# Patient Record
Sex: FEMALE | Race: WHITE | NOT HISPANIC OR LATINO
[De-identification: names, ages, dates, MRNs, and addresses within clinical notes are randomized per-mention and may not be internally consistent; named-entity substitution may affect disease eponyms.]

---

## 2017-03-30 ENCOUNTER — TRANSCRIPTION ENCOUNTER (OUTPATIENT)
Age: 22
End: 2017-03-30

## 2017-08-24 ENCOUNTER — TRANSCRIPTION ENCOUNTER (OUTPATIENT)
Age: 22
End: 2017-08-24

## 2017-10-03 ENCOUNTER — TRANSCRIPTION ENCOUNTER (OUTPATIENT)
Age: 22
End: 2017-10-03

## 2018-04-20 PROBLEM — Z00.00 ENCOUNTER FOR PREVENTIVE HEALTH EXAMINATION: Status: ACTIVE | Noted: 2018-04-20

## 2018-04-23 ENCOUNTER — APPOINTMENT (OUTPATIENT)
Dept: ORTHOPEDIC SURGERY | Facility: CLINIC | Age: 23
End: 2018-04-23
Payer: COMMERCIAL

## 2018-04-23 VITALS
BODY MASS INDEX: 20.46 KG/M2 | WEIGHT: 107 LBS | HEART RATE: 87 BPM | HEIGHT: 60.5 IN | SYSTOLIC BLOOD PRESSURE: 114 MMHG | DIASTOLIC BLOOD PRESSURE: 76 MMHG

## 2018-04-23 PROCEDURE — 99204 OFFICE O/P NEW MOD 45 MIN: CPT

## 2018-04-23 PROCEDURE — 72082 X-RAY EXAM ENTIRE SPI 2/3 VW: CPT

## 2018-06-04 ENCOUNTER — APPOINTMENT (OUTPATIENT)
Dept: ORTHOPEDIC SURGERY | Facility: CLINIC | Age: 23
End: 2018-06-04
Payer: COMMERCIAL

## 2018-06-04 VITALS — BODY MASS INDEX: 21.2 KG/M2 | HEIGHT: 60 IN | WEIGHT: 108 LBS

## 2018-06-04 DIAGNOSIS — M42.00 JUVENILE OSTEOCHONDROSIS OF SPINE, SITE UNSPECIFIED: ICD-10-CM

## 2018-06-04 DIAGNOSIS — M41.126 ADOLESCENT IDIOPATHIC SCOLIOSIS, LUMBAR REGION: ICD-10-CM

## 2018-06-04 DIAGNOSIS — M40.209 UNSPECIFIED KYPHOSIS, SITE UNSPECIFIED: ICD-10-CM

## 2018-06-04 DIAGNOSIS — M54.9 DORSALGIA, UNSPECIFIED: ICD-10-CM

## 2018-06-04 DIAGNOSIS — M41.124 ADOLESCENT IDIOPATHIC SCOLIOSIS, THORACIC REGION: ICD-10-CM

## 2018-06-04 PROCEDURE — 99214 OFFICE O/P EST MOD 30 MIN: CPT

## 2018-07-09 ENCOUNTER — APPOINTMENT (OUTPATIENT)
Dept: ORTHOPEDIC SURGERY | Facility: CLINIC | Age: 23
End: 2018-07-09

## 2019-04-14 ENCOUNTER — TRANSCRIPTION ENCOUNTER (OUTPATIENT)
Age: 24
End: 2019-04-14

## 2019-10-01 ENCOUNTER — EMERGENCY (EMERGENCY)
Facility: HOSPITAL | Age: 24
LOS: 1 days | Discharge: HOME | End: 2019-10-01
Attending: EMERGENCY MEDICINE
Payer: COMMERCIAL

## 2019-10-01 VITALS
DIASTOLIC BLOOD PRESSURE: 80 MMHG | OXYGEN SATURATION: 98 % | SYSTOLIC BLOOD PRESSURE: 116 MMHG | TEMPERATURE: 98 F | HEART RATE: 77 BPM | RESPIRATION RATE: 16 BRPM | HEIGHT: 61 IN | WEIGHT: 110.89 LBS

## 2019-10-01 PROCEDURE — 99283 EMERGENCY DEPT VISIT LOW MDM: CPT

## 2019-10-01 NOTE — ED ADULT TRIAGE NOTE - CHIEF COMPLAINT QUOTE
Patient c/o right flank pain radiating to right abdominal area for more than 24 hours. Patient c/o slight nausea.

## 2019-10-02 VITALS
TEMPERATURE: 99 F | SYSTOLIC BLOOD PRESSURE: 122 MMHG | HEART RATE: 70 BPM | RESPIRATION RATE: 18 BRPM | OXYGEN SATURATION: 98 % | DIASTOLIC BLOOD PRESSURE: 78 MMHG

## 2019-10-02 LAB
APPEARANCE UR: CLEAR — SIGNIFICANT CHANGE UP
BACTERIA # UR AUTO: NEGATIVE — SIGNIFICANT CHANGE UP
BILIRUB UR-MCNC: NEGATIVE — SIGNIFICANT CHANGE UP
COLOR SPEC: YELLOW — SIGNIFICANT CHANGE UP
DIFF PNL FLD: ABNORMAL
EPI CELLS # UR: 1 — SIGNIFICANT CHANGE UP
GLUCOSE UR QL: NEGATIVE — SIGNIFICANT CHANGE UP
HCG UR QL: NEGATIVE — SIGNIFICANT CHANGE UP
HYALINE CASTS # UR AUTO: 3 /LPF — HIGH (ref 0–7)
KETONES UR-MCNC: NEGATIVE — SIGNIFICANT CHANGE UP
LEUKOCYTE ESTERASE UR-ACNC: NEGATIVE — SIGNIFICANT CHANGE UP
NITRITE UR-MCNC: NEGATIVE — SIGNIFICANT CHANGE UP
PH UR: 6 — SIGNIFICANT CHANGE UP (ref 5–8)
PROT UR-MCNC: NEGATIVE — SIGNIFICANT CHANGE UP
RBC CASTS # UR COMP ASSIST: 1 /HPF — SIGNIFICANT CHANGE UP (ref 0–4)
SP GR SPEC: 1.01 — SIGNIFICANT CHANGE UP (ref 1.01–1.02)
UROBILINOGEN FLD QL: NEGATIVE — SIGNIFICANT CHANGE UP
WBC UR QL: 0 /HPF — SIGNIFICANT CHANGE UP (ref 0–5)

## 2019-10-02 PROCEDURE — 87086 URINE CULTURE/COLONY COUNT: CPT

## 2019-10-02 PROCEDURE — 81025 URINE PREGNANCY TEST: CPT

## 2019-10-02 PROCEDURE — 81001 URINALYSIS AUTO W/SCOPE: CPT

## 2019-10-02 PROCEDURE — 99283 EMERGENCY DEPT VISIT LOW MDM: CPT

## 2019-10-02 RX ORDER — IBUPROFEN 200 MG
600 TABLET ORAL ONCE
Refills: 0 | Status: COMPLETED | OUTPATIENT
Start: 2019-10-02 | End: 2019-10-02

## 2019-10-02 RX ORDER — LIDOCAINE 4 G/100G
1 CREAM TOPICAL ONCE
Refills: 0 | Status: COMPLETED | OUTPATIENT
Start: 2019-10-02 | End: 2019-10-02

## 2019-10-02 RX ADMIN — LIDOCAINE 1 PATCH: 4 CREAM TOPICAL at 00:51

## 2019-10-02 RX ADMIN — Medication 600 MILLIGRAM(S): at 00:51

## 2019-10-02 NOTE — ED PROVIDER NOTE - PROGRESS NOTE DETAILS
Zane PGY3: Pt assessed at beside. Pt resting comfortably, pain controlled, appears comfortable, pt questions answered. Vital signs stable. Still c/o mild back pain reports meds not really helping, abdomen is still ntnd, informed of negative urine, pt concerned that this episode of back pain not like prior, r/b/a discussion with pt for additional workup offered tvus for pelvic pathology, declined at this time, would prefer to follow up with outpt obgyn. Will d/c with PMD f/u, strict return precautions given with read back per pt/family/caregiver.

## 2019-10-02 NOTE — ED PROVIDER NOTE - PATIENT PORTAL LINK FT
You can access the FollowMyHealth Patient Portal offered by [facility] by registering at the following website: [email or web address]. By joining [facility]’s FollowMyHealth portal, you will also be able to view your health information using other applications (apps) compatible with our system.

## 2019-10-02 NOTE — ED ADULT NURSE NOTE - OBJECTIVE STATEMENT
24 year old female comes to the ED c/o right sided flank pain radiating to right side of abdomen beginning yesterday. Patient has no PMH. As per patient, patient describes pain to be a "tight pain" that worsens with movement and twisting. Patient denies f/c, n/v/d, hematuria/dysuria at the time of onset of pain. Patient states pain feels like the same pain when dx with a UTI years ago. Patient also c/o abdominal cramping x6days ago that resolved on its own. Patient is a/ox3, VSS, ambulatory, sensory/motor function intact, and c/o 3/10 pain on the pain scale. Patient's abdomen is soft, nontender and nondistended at this time. Peripheral pulses are strong and equal b/l with no edema noted. Patient denies CP/SOB, f/c, n/v/d, dizziness/lightheadedness, numbness/tingling/weakness, hematuria/dysuria/frequency at this time.

## 2019-10-02 NOTE — ED PROVIDER NOTE - OBJECTIVE STATEMENT
25 y/o female with no significant pmhx c/o 3/10 "achy" right flank pain radiating to mid/lower back and RLQ since last night. +mild nausea. States she began having right-sided abd cramping pain x6 days ago which improved spontaneously. Pt had no complaints until yesterday, when pain began in right flank. Pain is now more severe in mid/lower back and is worse with movement, bending, and twisting. Pt denies any heavy lifting or injury to back. She had not taken anything for pain control. Here today because pain is not improving. Notes she had similar pain episode when taking abx for UTI however pain was not as severe. Also notes that she typically has a BM daily but has not had one in 24hrs. Denies fever, chills, vomiting, dysuria, hematuria, burning urination,  frequency, hx of kidney stones or family hx of kidney stones. NKDA. LNMP: x2 weeks ago. 25 y/o female with no significant pmhx c/o 3/10 "achy" right flank pain radiating to mid/lower back since last night. +mild nausea. States she began having right-sided abd cramping pain x6 days ago which improved spontaneously. Pt had no complaints until yesterday, when pain began in right flank. Pain is now more severe in mid/lower back and is worse with movement, bending, and twisting. Pt denies any heavy lifting or injury to back. She had not taken anything for pain control. Here today because pain is not improving. Notes she had similar pain episode when taking abx for UTI however pain was not as severe. Also notes that she typically has a BM daily but has not had one in 24hrs. Denies fever, chills, vomiting, dysuria, hematuria, burning urination,  frequency, hx of kidney stones or family hx of kidney stones. NKDA. LNMP: x2 weeks ago.

## 2019-10-02 NOTE — ED PROVIDER NOTE - CLINICAL SUMMARY MEDICAL DECISION MAKING FREE TEXT BOX
25 y/o female with no significant pmhx c/o right flank/mid-back pain not improving and exacerbated by movement since yesterday. Differential includes renal cholic vs. UTI, vs. MSK strain/sprain. Will check urine, pain control, and reassess. 25 y/o female with no significant pmhx c/o right flank/mid-back pain not improving and exacerbated by movement since yesterday. Differential includes renal colic vs. UTI vs. musculoskeletal strain/sprain. Will check urine, pain control, and reassess. 23 y/o female with no significant pmhx c/o right flank/mid-back pain not improving and exacerbated by movement since yesterday. Differential includes renal colic vs. UTI vs. musculoskeletal strain/sprain. Will check urine, pain control, and reassess.  patient signed out to Dr. Torres at this time, pending UA results and reassessment at time of signout

## 2019-10-03 LAB
CULTURE RESULTS: SIGNIFICANT CHANGE UP
SPECIMEN SOURCE: SIGNIFICANT CHANGE UP

## 2019-10-04 RX ORDER — CEPHALEXIN 500 MG
1 CAPSULE ORAL
Qty: 14 | Refills: 0
Start: 2019-10-04 | End: 2019-10-10

## 2019-10-04 NOTE — ED POST DISCHARGE NOTE - DETAILS
Spoke to pt informed of results stil symptomatic decision to start Keflex, rx sent to pt pharmacy - Starr Izquierdo PA-C

## 2019-10-16 ENCOUNTER — TRANSCRIPTION ENCOUNTER (OUTPATIENT)
Age: 24
End: 2019-10-16

## 2020-02-27 ENCOUNTER — TRANSCRIPTION ENCOUNTER (OUTPATIENT)
Age: 25
End: 2020-02-27